# Patient Record
Sex: FEMALE | Race: WHITE | NOT HISPANIC OR LATINO | Employment: FULL TIME | URBAN - METROPOLITAN AREA
[De-identification: names, ages, dates, MRNs, and addresses within clinical notes are randomized per-mention and may not be internally consistent; named-entity substitution may affect disease eponyms.]

---

## 2022-06-08 ENCOUNTER — TELEPHONE (OUTPATIENT)
Dept: PRIMARY CARE CLINIC | Facility: CLINIC | Age: 22
End: 2022-06-08

## 2022-06-08 NOTE — TELEPHONE ENCOUNTER
SPOKE TO DAUGHTER VOICES UNDERSTANDING. STATES SHE DID CHECK LAB TODAY I have explained the procedure, including indications, risks, and alternatives.  Barbra Delgado gave informed consent and is medically ready for surgery. WILL CALL [facility] WITH RESULT.

## 2022-06-08 NOTE — TELEPHONE ENCOUNTER
----- Message from Edwige Osborne MD sent at 6/1/2022  7:18 PM CDT -----  Regarding: INR  INR is good, keep same dose, recheck in one week as per protocol.